# Patient Record
Sex: MALE | Race: WHITE
[De-identification: names, ages, dates, MRNs, and addresses within clinical notes are randomized per-mention and may not be internally consistent; named-entity substitution may affect disease eponyms.]

---

## 2017-02-21 ENCOUNTER — HOSPITAL ENCOUNTER (EMERGENCY)
Dept: HOSPITAL 57 - BURERS | Age: 52
Discharge: HOME | End: 2017-02-21
Payer: MEDICARE

## 2017-02-21 DIAGNOSIS — I10: ICD-10-CM

## 2017-02-21 DIAGNOSIS — I25.2: ICD-10-CM

## 2017-02-21 DIAGNOSIS — F17.210: ICD-10-CM

## 2017-02-21 DIAGNOSIS — S60.414A: ICD-10-CM
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
## 2017-02-21 DIAGNOSIS — S60.412A: ICD-10-CM

## 2017-02-21 DIAGNOSIS — S62.630B: Primary | ICD-10-CM

## 2017-02-21 LAB
ALP SERPL-CCNC: 80 U/L (ref 40–150)
ALT SERPL W P-5'-P-CCNC: 15 U/L (ref 0–55)
ANION GAP SERPL CALC-SCNC: 16 MMOL/L (ref 10–20)
AST SERPL-CCNC: 15 U/L (ref 5–34)
BASOPHILS # BLD AUTO: 0.1 THOU/UL (ref 0–0.2)
BASOPHILS NFR BLD AUTO: 1 % (ref 0–1)
BILIRUB SERPL-MCNC: 0.4 MG/DL (ref 0.2–1.2)
BUN SERPL-MCNC: 18 MG/DL (ref 8.4–25.7)
CALCIUM SERPL-MCNC: 8.9 MG/DL (ref 7.8–10.44)
CHLORIDE SERPL-SCNC: 106 MMOL/L (ref 98–107)
CO2 SERPL-SCNC: 24 MMOL/L (ref 22–29)
CREAT CL PREDICTED SERPL C-G-VRATE: 0 ML/MIN (ref 70–130)
EOSINOPHIL # BLD AUTO: 0.1 THOU/UL (ref 0–0.7)
EOSINOPHIL NFR BLD AUTO: 1.4 % (ref 0–10)
GLOBULIN SER CALC-MCNC: 3 G/DL (ref 2.4–3.5)
HCT VFR BLD CALC: 43.3 % (ref 42–52)
LYMPHOCYTES # BLD AUTO: 2.4 THOU/UL (ref 1.2–3.4)
MONOCYTES # BLD AUTO: 0.8 THOU/UL (ref 0.11–0.59)
MONOCYTES NFR BLD AUTO: 8.3 % (ref 0–10)
NEUTROPHILS # BLD AUTO: 6.1 THOU/UL (ref 1.4–6.5)
RBC # BLD AUTO: 4.78 MILL/UL (ref 4.7–6.1)
WBC # BLD AUTO: 9.5 THOU/UL (ref 4.8–10.8)

## 2017-02-21 PROCEDURE — 96365 THER/PROPH/DIAG IV INF INIT: CPT

## 2017-02-21 PROCEDURE — 85025 COMPLETE CBC W/AUTO DIFF WBC: CPT

## 2017-02-21 PROCEDURE — 96376 TX/PRO/DX INJ SAME DRUG ADON: CPT

## 2017-02-21 PROCEDURE — 96375 TX/PRO/DX INJ NEW DRUG ADDON: CPT

## 2017-02-21 PROCEDURE — 26770 TREAT FINGER DISLOCATION: CPT

## 2017-02-21 PROCEDURE — 26755 TREAT FINGER FRACTURE EACH: CPT

## 2017-02-21 PROCEDURE — 12002 RPR S/N/AX/GEN/TRNK2.6-7.5CM: CPT

## 2017-02-21 PROCEDURE — 80053 COMPREHEN METABOLIC PANEL: CPT

## 2017-02-21 NOTE — RAD
RIGHT HAND THREE VIEWS

2/21/17

 

Comparison is made with the prior study of 6/14/14 done at [facility]. There is an acute fr
acture of the distal phalanx of the index finger which is partially comminuted. The distal fragments
 are displaced towards the volar aspect of the hand quite significantly. There is an old amputation 
of the distal end of the third digit through a portion of the distal phalanx. This is not changed in
 appearance over time. An old fifth metacarpal fracture was seen. The carpal bones all appear intact
. 

 

IMPRESSION:  

1.      Acute displaced fracture of the distal phalanx of the index finger with partial comminution.
 

2.      Old traumatic injuries as detailed above.

 

POS: HOME

## 2018-01-29 ENCOUNTER — HOSPITAL ENCOUNTER (INPATIENT)
Dept: HOSPITAL 92 - ERS | Age: 53
LOS: 1 days | Discharge: HOME | DRG: 247 | End: 2018-01-30
Attending: INTERNAL MEDICINE | Admitting: INTERNAL MEDICINE
Payer: COMMERCIAL

## 2018-01-29 ENCOUNTER — HOSPITAL ENCOUNTER (EMERGENCY)
Dept: HOSPITAL 57 - BURERS | Age: 53
Discharge: ANOTHER HOSPITAL | End: 2018-01-29
Payer: COMMERCIAL

## 2018-01-29 VITALS — BODY MASS INDEX: 28.2 KG/M2

## 2018-01-29 DIAGNOSIS — I21.4: Primary | ICD-10-CM

## 2018-01-29 DIAGNOSIS — F17.210: ICD-10-CM

## 2018-01-29 DIAGNOSIS — E78.00: ICD-10-CM

## 2018-01-29 DIAGNOSIS — E78.5: ICD-10-CM

## 2018-01-29 DIAGNOSIS — I10: ICD-10-CM

## 2018-01-29 DIAGNOSIS — I20.0: Primary | ICD-10-CM

## 2018-01-29 DIAGNOSIS — I25.2: ICD-10-CM

## 2018-01-29 DIAGNOSIS — Z91.19: ICD-10-CM

## 2018-01-29 LAB
ALBUMIN SERPL BCG-MCNC: 4.3 G/DL (ref 3.5–5)
ALP SERPL-CCNC: 88 U/L (ref 40–150)
ALT SERPL W P-5'-P-CCNC: 16 U/L (ref 8–55)
ANION GAP SERPL CALC-SCNC: 18 MMOL/L (ref 10–20)
AST SERPL-CCNC: 14 U/L (ref 5–34)
BASOPHILS # BLD AUTO: 0.1 THOU/UL (ref 0–0.2)
BASOPHILS NFR BLD AUTO: 0.6 % (ref 0–1)
BILIRUB SERPL-MCNC: (no result) MG/DL (ref 0.2–1.2)
BUN SERPL-MCNC: 20 MG/DL (ref 8.4–25.7)
CALCIUM SERPL-MCNC: 9.3 MG/DL (ref 7.8–10.44)
CHLORIDE SERPL-SCNC: 103 MMOL/L (ref 98–107)
CK MB SERPL-MCNC: 3.4 NG/ML (ref 0–6.6)
CO2 SERPL-SCNC: 27 MMOL/L (ref 22–29)
CREAT CL PREDICTED SERPL C-G-VRATE: 0 ML/MIN (ref 70–130)
EOSINOPHIL # BLD AUTO: 0.2 THOU/UL (ref 0–0.7)
EOSINOPHIL NFR BLD AUTO: 1.9 % (ref 0–10)
GLOBULIN SER CALC-MCNC: 3.3 G/DL (ref 2.4–3.5)
GLUCOSE SERPL-MCNC: 140 MG/DL (ref 70–105)
HGB BLD-MCNC: 13.6 G/DL (ref 14–18)
LIPASE SERPL-CCNC: 24 U/L (ref 8–78)
LYMPHOCYTES # BLD AUTO: 2.7 THOU/UL (ref 1.2–3.4)
LYMPHOCYTES NFR BLD AUTO: 25.3 % (ref 21–51)
MCH RBC QN AUTO: 29.3 PG (ref 27–31)
MCV RBC AUTO: 87.2 FL (ref 80–94)
MDIFF COMPLETE?: YES
MONOCYTES # BLD AUTO: 0.7 THOU/UL (ref 0.11–0.59)
MONOCYTES NFR BLD AUTO: 6.6 % (ref 0–10)
NEUTROPHILS # BLD AUTO: 7.1 THOU/UL (ref 1.4–6.5)
NEUTROPHILS NFR BLD AUTO: 65.6 % (ref 42–75)
PLATELET # BLD AUTO: 398 THOU/UL (ref 130–400)
PLATELET BLD QL SMEAR: (no result)
POTASSIUM SERPL-SCNC: 3.8 MMOL/L (ref 3.5–5.1)
RBC # BLD AUTO: 4.64 MILL/UL (ref 4.7–6.1)
SODIUM SERPL-SCNC: 144 MMOL/L (ref 136–145)
TROPONIN I SERPL DL<=0.01 NG/ML-MCNC: (no result) NG/ML (ref ?–0.03)
TROPONIN I SERPL DL<=0.01 NG/ML-MCNC: 0.31 NG/ML (ref ?–0.03)
TROPONIN I SERPL DL<=0.01 NG/ML-MCNC: 0.6 NG/ML (ref ?–0.03)
TROPONIN I SERPL DL<=0.01 NG/ML-MCNC: 0.87 NG/ML (ref ?–0.03)
WBC # BLD AUTO: 10.7 THOU/UL (ref 4.8–10.8)

## 2018-01-29 PROCEDURE — 96374 THER/PROPH/DIAG INJ IV PUSH: CPT

## 2018-01-29 PROCEDURE — C9606 PERC D-E COR REVASC W AMI S: HCPCS

## 2018-01-29 PROCEDURE — 027034Z DILATION OF CORONARY ARTERY, ONE ARTERY WITH DRUG-ELUTING INTRALUMINAL DEVICE, PERCUTANEOUS APPROACH: ICD-10-PCS | Performed by: INTERNAL MEDICINE

## 2018-01-29 PROCEDURE — 36415 COLL VENOUS BLD VENIPUNCTURE: CPT

## 2018-01-29 PROCEDURE — 76942 ECHO GUIDE FOR BIOPSY: CPT

## 2018-01-29 PROCEDURE — 84484 ASSAY OF TROPONIN QUANT: CPT

## 2018-01-29 PROCEDURE — 93010 ELECTROCARDIOGRAM REPORT: CPT

## 2018-01-29 PROCEDURE — 96375 TX/PRO/DX INJ NEW DRUG ADDON: CPT

## 2018-01-29 PROCEDURE — 80306 DRUG TEST PRSMV INSTRMNT: CPT

## 2018-01-29 PROCEDURE — 85025 COMPLETE CBC W/AUTO DIFF WBC: CPT

## 2018-01-29 PROCEDURE — 96361 HYDRATE IV INFUSION ADD-ON: CPT

## 2018-01-29 PROCEDURE — 94760 N-INVAS EAR/PLS OXIMETRY 1: CPT

## 2018-01-29 PROCEDURE — 93798 PHYS/QHP OP CAR RHAB W/ECG: CPT

## 2018-01-29 PROCEDURE — 80053 COMPREHEN METABOLIC PANEL: CPT

## 2018-01-29 PROCEDURE — 83690 ASSAY OF LIPASE: CPT

## 2018-01-29 PROCEDURE — 82553 CREATINE MB FRACTION: CPT

## 2018-01-29 PROCEDURE — C1874 STENT, COATED/COV W/DEL SYS: HCPCS

## 2018-01-29 PROCEDURE — 92941 PRQ TRLML REVSC TOT OCCL AMI: CPT

## 2018-01-29 PROCEDURE — 93005 ELECTROCARDIOGRAM TRACING: CPT

## 2018-01-29 PROCEDURE — 80061 LIPID PANEL: CPT

## 2018-01-29 PROCEDURE — 93454 CORONARY ARTERY ANGIO S&I: CPT

## 2018-01-29 PROCEDURE — 85347 COAGULATION TIME ACTIVATED: CPT

## 2018-01-29 PROCEDURE — B2111ZZ FLUOROSCOPY OF MULTIPLE CORONARY ARTERIES USING LOW OSMOLAR CONTRAST: ICD-10-PCS | Performed by: INTERNAL MEDICINE

## 2018-01-29 PROCEDURE — 81001 URINALYSIS AUTO W/SCOPE: CPT

## 2018-01-29 PROCEDURE — 02C03ZZ EXTIRPATION OF MATTER FROM CORONARY ARTERY, ONE ARTERY, PERCUTANEOUS APPROACH: ICD-10-PCS | Performed by: INTERNAL MEDICINE

## 2018-01-29 PROCEDURE — 71045 X-RAY EXAM CHEST 1 VIEW: CPT

## 2018-01-29 PROCEDURE — C1769 GUIDE WIRE: HCPCS

## 2018-01-29 PROCEDURE — 96372 THER/PROPH/DIAG INJ SC/IM: CPT

## 2018-01-29 PROCEDURE — 93306 TTE W/DOPPLER COMPLETE: CPT

## 2018-01-29 RX ADMIN — HYDROCODONE BITARTRATE AND ACETAMINOPHEN PRN TAB: 10; 325 TABLET ORAL at 21:16

## 2018-01-29 NOTE — HP
PRIMARY CARE PHYSICIAN:  Leonard Rivas M.D.

 

REASON FOR ADMISSION:  Transfer from [facility] for chest pain and 
non-STEMI.

 

HISTORY OF PRESENT ILLNESS:  A 52-year-old male with a history of tobacco abuse 
disorder, as well as history of myocardial infarction and coronary artery 
disease with stent, who is not taking any specific medication.  He has 
underlying noncompliance history.  He has ongoing smoking.  He went to [facility] with acute onset of chest pain.  Patient reports that chest pain 
started when he was resting at home which was substernal as well as left-sided 
in location, pressure-like sensation associated with nausea and shortness of 
breath.  Patient describes pain as dull in nature about 10/10 in intensity.  It 
was very severe when it started and after emergency room treatment, patient's 
pain reduced but not completely subsided.  The patient reports that all of this 
has been started while doing sex.  He took Viagra yesterday evening.  He denies 
any associated dizziness, palpitations or syncope.  He denies any pleuritic 
chest pain.  He denies any fever or chills.  He denies any hematochezia, melena
, constipation, diarrhea, UTI symptoms.  He denies any lower extremity edema or 
any immobilization.

 

At [facility], patient's electrocardiogram was unremarkable and 
his first troponin was negative.  When he was transferred to our emergency room
, we did second cardiac enzyme and that was significantly abnormal.  Patient 
was still having pain.

 

EMERGENCY ROOM COURSE:  Patient was given aspirin 324 mg, Lopressor 5 mg x2 and 
Lovenox 1 mg per kg at [facility].  Patient was also given 
morphine 2 mg IV push for his pain and he was given IV fluid.

 

PAST MEDICAL HISTORY:  Hypertension, but the patient is not taking any 
medication, history of MI with coronary artery disease with stent placement, 
medical noncompliance.

 

PAST SURGICAL HISTORY:  Patient has amputation of tip of the third digit of 
right upper extremity, tonsillectomy, bilateral hip replacement, cardiac 
catheterization with stent placement.

 

PAST PSYCHIATRIC HISTORY:  Medical noncompliance, no specific psychiatric 
history.

 

SOCIAL HISTORY:  Patient is smoking about 1.5 packs per day.  He denies any 
alcohol abuse.  He denies any other illicit drug abuse.  He is a .

 

FAMILY HISTORY:  No strong family history of premature coronary artery disease, 
stroke or cancer.

 

ALLERGIES:  Patient denies any allergies to medications.

 

CURRENT HOME MEDICATION:  Patient is taking Norco 10 one tablet as needed basis.



REVIEW OF SYSTEMS:  The following complete review of systems was negative, 
unless otherwise mentioned in the HPI or below:

Constitutional:  Weight loss or gain, ability to conduct usual activities.

Skin:  Rash, itching.

Eyes:  Double vision, pain.

ENT/Mouth:  Nose bleeding, neck stiffness, pain, tenderness.

Cardiovascular:  Palpitations, dyspnea on exertion, orthopnea.

Respiratory:  Shortness of breath, wheezing, cough, hemoptysis, fever or night 
sweats.

Gastrointestinal:  Poor appetite, abdominal pain, heartburn, nausea, vomiting, 
constipation, or diarrhea.

Genitourinary:  Urgency, frequency, dysuria, nocturia.

Musculoskeletal:  Pain, swelling.

Neurologic/Psychiatric:  Anxiety, depression.

Allergy/Immunologic:  Skin rash, bleeding tendency.

 

Please see my HPI for pertinent positive and negative.  All other review of 
systems reviewed and negative except as mentioned in the HPI.

 

PHYSICAL EXAMINATION:

VITAL SIGNS:  On arrival to [facility], blood pressure 192/101, 
pulse 95, respiratory rate 18, saturation 97% on room air, temperature 98.2, 
weight 86.1 kilograms, currently blood pressure improved to 118/57.

GENERAL:  Patient is currently alert, awake, no obvious acute distress.

HEAD:  Normocephalic, atraumatic.

EYES:  Pupils round, reactive to light.  Extraocular muscle intact.

ENT:  Oropharynx within normal limits.  Moist mucous membranes.  No oral 
lesions.  No pharyngeal erythema, no exudates.

NECK:  Supple, no JVD, no thyromegaly, no carotid bruit.

LUNGS:  Clear to auscultation without any rhonchi or rales.

CARDIAC:  S1, S2 regular without any murmur, no gallop, no rub.

ABDOMEN:  Soft, bowel sounds present.  No suprapubic tenderness.  No 
organomegaly, no mass, no peritoneal sign.

BACK:  Examination unremarkable, no CVA tenderness.

EXTREMITIES:  Upper extremity passive movements of all joints are normal.  
Lower extremities:  No edema.  Good peripheral pulsation.

SKIN:  No skin rash.

HEMATOLOGICAL SYSTEM:  No lymphadenopathy.

PSYCHIATRIC:  Normal affect.

NEUROLOGIC:  The patient is alert and oriented x3.  Cranial nerves II-XII 
intact.  Motor and sensation within normal limits.  Reflexes symmetrical.  No 
focal neurological deficit noted.

 

IMAGING DATA AND SIGNIFICANT LABORATORY DATA:

1.  EKG based on my review reveals normal sinus rhythm, nonspecific ST-T 
changes.

2.  Second EKG after troponin elevation also showed normal sinus rhythm without 
any change.

3.  CBC:  WBC 10.7, hemoglobin 13.6, and platelets 398 with 4 band.

4.  BMP:  Sodium 144, potassium 3.8, chloride 103, carbon dioxide 27, anion gap 
18, BUN 20, creatinine 1.24, glucose 140, calcium 9.3.

5.  LFT:  AST 14, ALT 16, alkaline phosphatase 88, albumin 4.3, lipase 24.  CK-
MB 3.4, troponin I less than 0.010, subsequently troponin 0.313.

6.  Chest x-ray based on my review, no acute cardiopulmonary process.

 

ASSESSMENT AND PLAN/IMPRESSION:

1.  Non-ST elevation myocardial infarction.  This patient has acute onset of 
angina that started with sex.  Patient took Viagra and that is why we will 
avoid nitroglycerin to prevent hypotension.  The patient's first troponin was 
normal and second troponin is significantly abnormal.  His EKG is normal.  
Currently, still patient has reduced intensity pain.  At this point, patient 
already received Lopressor 5 mg x2 at [facility], aspirin 325 mg 
and Lovenox 1 mg per kg at [facility].  We will keep this patient 
in the hospital for full admission.  We will consult cardiology.  The patient 
may need a cardiac catheterization to rule out in-stent stenosis versus new 
stenoses.  The patient has several risk factors of coronary artery disease 
including smoking, noncompliance with the treatment.  We will check lipid 
profile for risk stratification and we will also start Lipitor 40 mg p.o. at 
bedtime.  We will continue with Coreg 3.125 mg p.o. b.i.d.  Healthy lifestyle 
measures discussed with the patient.  We will obtain echocardiography to assess 
ejection fraction and other structural abnormality.  We will check urine drug 
screen.  Further decisions will defer to Cardiology.

2.  History of coronary artery disease with history of stent.  The patient is 
not taking any medication at this point.  He is very noncompliant with the 
treatment.

3.  Please see for further details in problem #1.

4.  Tobacco abuse disorder.  Smoking cessation counseling given.  Healthy 
lifestyle measures discussed with the patient.  We will offer nicotine patch if 
needed.

5.  Hypertension.  We are starting Coreg 3.125 mg p.o. b.i.d.  We will also 
start ACE inhibitor if needed based on echocardiographic finding.

6.  Medical noncompliance.  I provided patient counseling to be compliant with 
medical treatment.

7.  Deep venous thrombosis prophylaxis.  Patient is already on full dose of 
Lovenox therapy.

8.  Gastrointestinal prophylaxis.  Pepcid 20 mg p.o. b.i.d.

9.  Code status:  The patient is FULL CODE.  The patient is making his decision 
by himself.  He does not have any surrogate decision maker.

 

Disposition and plan based on clinical course and cardiology recommendation, 
this patient expected to stay in hospital more than 2 midnights.  Plan of care 
discussed with the patient and family member at bedside in the emergency room.

 

MTDD

## 2018-01-29 NOTE — PDOC.EVN
Event Note





- Event Note


Event Note: 





Pt seen and examined in ER hold. chart reviewed. still having Chest 

pain.tropoinin trending up.Discussed w cardiology.Will give 1 dose plavix 300 

mg and possible Cath lab later today.will follow..HD stable

## 2018-01-29 NOTE — RAD
PORTABLE CHEST:

 

DATE: 1/29/18.

 

FINDINGS: 

An AP portable film at 0128 is compared with the 5/18/17 study.

 

The heart is normal in size and the lungs are clear.  No infiltrate or effusion was seen.  There is n
o vascular congestion or edema.  The trachea is midline. 

 

IMPRESSION: 

No acute thoracic finding.

 

POS: HOME

## 2018-01-29 NOTE — CON
DATE OF CONSULTATION:  2018

 

REASON FOR CONSULTATION:  Non-ST elevation myocardial infarction with ongoing chest pain.

 

HISTORY OF PRESENT ILLNESS:  Mr. Taylor is a 52-year-old man.  The patient is a previous patient 
of Dr. Arsen Huertas with a previous history of myocardial infarction.

 

The patient initially presented with an acute inferior myocardial infarction in 2015.  He was take
n on an emergency basis to the cardiac catheterization lab and was found to have acute inferior wall 
myocardial infarction.  He did need a transient pacing for severe bradycardia.

 

The patient was found to have the followin.  Left main widely patent.

2.  LAD, 30%-40% eccentric plaque in the mid LAD.

3.  Ramus, no significant stenosis.

4.  Left circumflex, 20% plaque.

5.  Right coronary was 100% occluded distal to the posterior descending branch.  A stent was placed, 
3.0 x 28 mm Promus drug-eluting stent at 12 atmospheres.  There was noted to be a codominant artery. 
 Reading the notices, the right coronary was totally occluded distally prior to the PDA.  From readin
g the note, it looks like the stent was placed across the PDA.

 

The patient's bradycardia improved subsequently.

 

The patient has done relatively well following that, but he said last night, he had the onset of earnestine
re chest pain.  Ultimately, he came to the emergency room.

 

EKG showed an old inferior infarct with Q-waves in lead 3 and aVF, but no ST changes.  The patient wa
s having initially severe chest pain, continues to have chest pain now, although, it is less severe.

 

Medications, the patient stopped taking all of his medications including aspirin.  He was given aspir
in and Lovenox in the [facility].  He has been given Plavix 300 mg here.

 

PAST SURGICAL HISTORY:  Amputation at the tip of the third digit, right upper extremity.

 

SOCIAL HISTORY:  Smokes one pack of cigarettes per day.  No alcohol.

 

FAMILY HISTORY:  No strong family history of premature coronary artery disease.

 

ALLERGIES:  None known.

 

MEDICATIONS:  He stopped all medicines.  The only medicine he was taking is Norco as needed basis.

 

PHYSICAL EXAMINATION:

GENERAL:  This is a 52-year-old man who says he did not get much sleep last night.

VITAL SIGNS:  Blood pressure 130/70, pulse 60, it is regular.

HEENT:  Eyes:  Sclerae nonicteric.  Mouth, mucous membranes moist.

NECK:  Supple, no lymphadenopathy.

LUNGS:  Clear, no wheezing, rales or rhonchi.

CARDIAC:  Normal S1, normal S2.  There is no murmur, rub or gallop.

ABDOMEN:  Soft, nontender, no hepatosplenomegaly.

EXTREMITIES:  Warm, dry, no clubbing, cyanosis or edema.

HEMATOLOGIC:  No unusual bruising.

GENITOURINARY:  No burning with urination.

MUSCULOSKELETAL:  No unusual joint abnormalities.  Peripheral pulses are intact in the right leg and 
pedal and posterior tibial pulses.

 

LABORATORY DATA AND X-RAY FINDINGS:  EKG, sinus rhythm with biphasic T waves in V2 and V3 as well as 
V4.

 

Troponin level has gone from 0.313 to 0.874.  Creatinine was 1.24.  BNP 11.  Hemoglobin is 13.6, hema
tocrit 40.4.

 

ASSESSMENT:

1.  Non-ST elevation myocardial infarction with continued angina and slight increased levels of tropo
jasmine.

2.  Noncompliance, has had stopped all medicines.

3.  Noncompliance with smoking cessation.  Continue to smoke.

4.  History of bradycardia when he had the inferior myocardial infarction.

5.  Ongoing chest pain.

6.  History of Viagra dose over 12 hours ago.  The exact time is not completely certain, he thinks it
 is about 11:00.

 

PLAN:  At this time, since he has continued chest pain, we will proceed to cardiac catheterization la
b.  Discussed there is a risk of severe hypotension if nitroglycerin as needed, nitroglycerin may be 
needed in case an intervention is indicated.  I will give intravenous fluids first and proceed.  Disc
ussed risks of stroke, heart attack, iodine allergy, loss of blood supply to the leg or kidney, risk 
of severe reaction to nitrates in case if  needed.  All discussed with the patient and wife.  They al
so understand that it is essential that he take medications as prescribed.  He has not been taking ev
en aspirin.  I have to discuss with the family for now and he needs to take an aspirin every day for 
the rest of his life.

## 2018-01-30 VITALS — DIASTOLIC BLOOD PRESSURE: 84 MMHG | SYSTOLIC BLOOD PRESSURE: 131 MMHG

## 2018-01-30 VITALS — TEMPERATURE: 98.3 F

## 2018-01-30 LAB
ALBUMIN SERPL BCG-MCNC: 3.7 G/DL (ref 3.5–5)
ALP SERPL-CCNC: 69 U/L (ref 40–150)
ALT SERPL W P-5'-P-CCNC: 13 U/L (ref 8–55)
ANION GAP SERPL CALC-SCNC: 11 MMOL/L (ref 10–20)
AST SERPL-CCNC: 15 U/L (ref 5–34)
BASOPHILS # BLD AUTO: 0 THOU/UL (ref 0–0.2)
BASOPHILS NFR BLD AUTO: 0.2 % (ref 0–1)
BILIRUB SERPL-MCNC: 0.3 MG/DL (ref 0.2–1.2)
BUN SERPL-MCNC: 12 MG/DL (ref 8.4–25.7)
CALCIUM SERPL-MCNC: 8.5 MG/DL (ref 7.8–10.44)
CHD RISK SERPL-RTO: 4.4 (ref ?–4.5)
CHLORIDE SERPL-SCNC: 107 MMOL/L (ref 98–107)
CHOLEST SERPL-MCNC: 148 MG/DL
CO2 SERPL-SCNC: 25 MMOL/L (ref 22–29)
CREAT CL PREDICTED SERPL C-G-VRATE: 121 ML/MIN (ref 70–130)
CRYSTAL-AUWI FLAG: 0 (ref 0–15)
DRUG SCREEN CUTOFF: (no result)
EOSINOPHIL # BLD AUTO: 0.2 THOU/UL (ref 0–0.7)
EOSINOPHIL NFR BLD AUTO: 1.9 % (ref 0–10)
GLOBULIN SER CALC-MCNC: 2.5 G/DL (ref 2.4–3.5)
GLUCOSE SERPL-MCNC: 116 MG/DL (ref 70–105)
HDLC SERPL-MCNC: 34 MG/DL
HEV IGM SER QL: 0.2 (ref 0–7.99)
HGB BLD-MCNC: 12.1 G/DL (ref 14–18)
HYALINE CASTS #/AREA URNS LPF: (no result) LPF
LDLC SERPL CALC-MCNC: 92 MG/DL
LYMPHOCYTES # BLD: 0.8 THOU/UL (ref 1.2–3.4)
LYMPHOCYTES NFR BLD AUTO: 9.6 % (ref 21–51)
MCH RBC QN AUTO: 29.6 PG (ref 27–31)
MCV RBC AUTO: 91 FL (ref 80–94)
MEDTOX CONTROL LINE VALID?: (no result)
MEDTOX READER #: (no result)
MONOCYTES # BLD AUTO: 0.7 THOU/UL (ref 0.11–0.59)
MONOCYTES NFR BLD AUTO: 8.2 % (ref 0–10)
NEUTROPHILS # BLD AUTO: 6.7 THOU/UL (ref 1.4–6.5)
NEUTROPHILS NFR BLD AUTO: 80.1 % (ref 42–75)
PATHC CAST-AUWI FLAG: 0 (ref 0–2.49)
PLATELET # BLD AUTO: 331 THOU/UL (ref 130–400)
POTASSIUM SERPL-SCNC: 4 MMOL/L (ref 3.5–5.1)
RBC # BLD AUTO: 4.1 MILL/UL (ref 4.7–6.1)
RBC UR QL AUTO: (no result) HPF (ref 0–3)
SODIUM SERPL-SCNC: 139 MMOL/L (ref 136–145)
SP GR UR STRIP: 1.02 (ref 1–1.04)
SPERM-AUWI FLAG: 0 (ref 0–9.9)
TRIGL SERPL-MCNC: 109 MG/DL (ref ?–150)
TROPONIN I SERPL DL<=0.01 NG/ML-MCNC: 1.74 NG/ML (ref ?–0.03)
WBC # BLD AUTO: 8.4 THOU/UL (ref 4.8–10.8)
YEAST-AUWI FLAG: 0 (ref 0–25)

## 2018-01-30 RX ADMIN — HYDROCODONE BITARTRATE AND ACETAMINOPHEN PRN TAB: 10; 325 TABLET ORAL at 03:07

## 2018-01-30 RX ADMIN — HYDROCODONE BITARTRATE AND ACETAMINOPHEN PRN TAB: 10; 325 TABLET ORAL at 08:12

## 2018-01-30 NOTE — PRG
DATE OF SERVICE:  01/30/2018

 

SUBJECTIVE:  The patient did well.  No chest pain or pressure.  He is outside now, not in his room, n
ot on the floor.

 

OBJECTIVE:

VITAL SIGNS:  Blood pressure 130/80, pulse when I examined him was 84.

LUNGS:  Clear.

CARDIAC:  Normal S1, normal S2.

ABDOMEN:  Soft, nontender.

EXTREMITIES:  No edema.

 

LABORATORY DATA:  Troponin was 1.7.

 

ASSESSMENT:

1.  Status post non-ST elevation infarction.

2.  History of smoking.

3.  History of noncompliance, off all medicines.

4.  Hypercholesterolemia.

 

PLAN:  He is to go home on,

1.  Brilinta 90 mg twice a day.

2.  Aspirin 81 mg  a day.

3.  Lisinopril 2.5 mg a day.

4.  Carvedilol 6.25 mg twice a day. 

5.  Lipitor 40 mg a day.

6.  Gave him some samples and a discount card for Brilinta.

## 2018-01-31 NOTE — DIS
PRIMARY CARE PHYSICIAN:  Leonard Rivas M.D.

 

DATE OF ADMISSION:  01/29/2018

 

DATE OF DISCHARGE:  01/30/2018

 

CONDITION AT THE TIME OF DISCHARGE:  Stable and improved.

 

DISCHARGE DIAGNOSES:

1.  Non-ST elevation myocardial infarction.

2.  Tobacco abuse.

3.  Hypertension.

4.  Dyslipidemia.

 

DISCHARGE MEDICATIONS:  Brilinta 90 mg b.i.d., aspirin 81 mg daily, lisinopril 2.5 mg daily, Coreg 6.
25 mg b.i.d., Lipitor 40 mg daily.

 

CONSULTATIONS:  Cardiology, Dr. Candy Echeverria.

 

PROCEDURES DONE:  Include cardiac catheterization with drug-eluting stent in the LAD.

 

Transthoracic echocardiogram which showed preserved ejection fraction of 50-55%, but inferior wall hy
pokinesis.

 

HISTORY OF PRESENTING ILLNESS:  Mr. Taylor is a 52-year-old male with known history of coronary a
rtery disease, status post stenting in the past, was very noncompliant with his modifications and con
tinues to smoke who presented to the emergency room with complaints of chest pain and severe in inten
sity.  He was found to have elevated cardiac enzymes and received aspirin, Lopressor, and Lovenox in 
the Emergency Room along with pain medication and was admitted for further evaluation.  Cardiology wa
s called from the emergency room and his troponin continued to trend upwards.  He received a dose of 
Plavix and was taken emergently to cardiac catheterization.  He received a drug-eluting stent in the 
left anterior descending artery.  He also has 40%, 50% and 60% stenoses elsewhere.  After the cardiac
 catheterization, he remained hemodynamically stable.  Dr. Echeverria saw the patient and he was started 
back on aspirin, statin, beta blocker, ACE inhibitor as well as antiplatelet medication in the form B
rilinta.

 

Prior to discharge, he was asymptomatic.  I discussed discharge plan with him and his significant oth
er and they said they will be able to afford the medications.  Samples for Brilinta were made availab
le to them by Dr. Echeverria as well.  He was instructed to follow up with Dr. Echeverria as well as his [facility] physician as soon as possible.

 

PHYSICAL EXAMINATION:

He was seen and examined prior to discharge.  His physical exam included:

VITAL SIGNS:  Temperature 98.3, pulse of 81, respirations 18, saturating 95% on room air, blood press
ure 131/84.

GENERAL:  No acute distress, awake, alert, oriented x3.

CHEST:  Clear to auscultation bilaterally.

CARDIOVASCULAR:  Rate and rhythm is regular.

NEUROLOGIC:  Nonfocal.

 

LABORATORY DATA:  CBC unremarkable.  Serum chemistries showed troponin peaked at 1.742.  Lipid panel 
within normal limits.  Urine drug screen was negative except for opiates.

 

He will follow up with cardiac rehabilitation as an outpatient.

## 2018-02-03 NOTE — EKG
Test Reason : 

Blood Pressure : ***/*** mmHG

Vent. Rate : 070 BPM     Atrial Rate : 070 BPM

   P-R Int : 146 ms          QRS Dur : 098 ms

    QT Int : 432 ms       P-R-T Axes : -14 067 083 degrees

   QTc Int : 466 ms

 

Normal sinus rhythm

Possible Inferior infarct , age undetermined

Abnormal ECG

 

Confirmed by REUBEN ZUNIGA ([id number]),  PARMINDER VARGHESE ([id number]) on 2/3/2018 1:46:53 PM

 

Referred By:  EFRAIN           Confirmed By:REUBEN ZUNIGA

## 2018-02-03 NOTE — EKG
Test Reason : 

Blood Pressure : ***/*** mmHG

Vent. Rate : 069 BPM     Atrial Rate : 069 BPM

   P-R Int : 140 ms          QRS Dur : 100 ms

    QT Int : 426 ms       P-R-T Axes : -13 079 071 degrees

   QTc Int : 456 ms

 

Normal sinus rhythm

Normal ECG

 

Confirmed by REUBEN ZUNIGA ([id number]),  PARMINDER VARGHESE ([id number]) on 2/3/2018 1:47:01 PM

 

Referred By:             Confirmed By:REUBEN ZUNIGA

## 2019-06-14 ENCOUNTER — HOSPITAL ENCOUNTER (EMERGENCY)
Dept: HOSPITAL 92 - ERS | Age: 54
LOS: 1 days | Discharge: HOME | End: 2019-06-15
Payer: COMMERCIAL

## 2019-06-14 DIAGNOSIS — N28.89: ICD-10-CM

## 2019-06-14 DIAGNOSIS — Z87.891: ICD-10-CM

## 2019-06-14 DIAGNOSIS — S00.01XA: ICD-10-CM

## 2019-06-14 DIAGNOSIS — I10: ICD-10-CM
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
## 2019-06-14 DIAGNOSIS — Z79.899: ICD-10-CM

## 2019-06-14 DIAGNOSIS — S06.0X9A: Primary | ICD-10-CM

## 2019-06-14 DIAGNOSIS — Z79.891: ICD-10-CM

## 2019-06-14 DIAGNOSIS — I25.2: ICD-10-CM

## 2019-06-14 PROCEDURE — 74177 CT ABD & PELVIS W/CONTRAST: CPT

## 2019-06-14 PROCEDURE — 96375 TX/PRO/DX INJ NEW DRUG ADDON: CPT

## 2019-06-14 PROCEDURE — 70450 CT HEAD/BRAIN W/O DYE: CPT

## 2019-06-14 PROCEDURE — 93005 ELECTROCARDIOGRAM TRACING: CPT

## 2019-06-14 PROCEDURE — 96374 THER/PROPH/DIAG INJ IV PUSH: CPT

## 2019-06-14 PROCEDURE — 71260 CT THORAX DX C+: CPT

## 2019-06-14 PROCEDURE — 72125 CT NECK SPINE W/O DYE: CPT
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
## 2019-06-27 ENCOUNTER — HOSPITAL ENCOUNTER (EMERGENCY)
Dept: HOSPITAL 92 - ERS | Age: 54
Discharge: HOME | End: 2019-06-27
Payer: COMMERCIAL

## 2019-06-27 DIAGNOSIS — Z79.82: ICD-10-CM

## 2019-06-27 DIAGNOSIS — I10: ICD-10-CM

## 2019-06-27 DIAGNOSIS — I25.10: ICD-10-CM

## 2019-06-27 DIAGNOSIS — Z79.899: ICD-10-CM
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
## 2019-06-27 DIAGNOSIS — I25.2: ICD-10-CM

## 2019-06-27 DIAGNOSIS — S20.211A: Primary | ICD-10-CM

## 2019-06-27 DIAGNOSIS — Z87.891: ICD-10-CM

## 2019-06-27 LAB
ALBUMIN SERPL BCG-MCNC: 4.4 G/DL (ref 3.5–5)
ALP SERPL-CCNC: 102 U/L (ref 40–150)
ALT SERPL W P-5'-P-CCNC: 15 U/L (ref 8–55)
ANION GAP SERPL CALC-SCNC: 15 MMOL/L (ref 10–20)
AST SERPL-CCNC: 12 U/L (ref 5–34)
BASOPHILS # BLD AUTO: 0.1 THOU/UL (ref 0–0.2)
BASOPHILS NFR BLD AUTO: 0.7 % (ref 0–1)
BILIRUB SERPL-MCNC: 0.3 MG/DL (ref 0.2–1.2)
BUN SERPL-MCNC: 10 MG/DL (ref 8.4–25.7)
CALCIUM SERPL-MCNC: 8.8 MG/DL (ref 7.8–10.44)
CHLORIDE SERPL-SCNC: 103 MMOL/L (ref 98–107)
CO2 SERPL-SCNC: 27 MMOL/L (ref 22–29)
CREAT CL PREDICTED SERPL C-G-VRATE: 0 ML/MIN (ref 70–130)
EOSINOPHIL # BLD AUTO: 0.3 THOU/UL (ref 0–0.7)
EOSINOPHIL NFR BLD AUTO: 3 % (ref 0–10)
GLOBULIN SER CALC-MCNC: 2.3 G/DL (ref 2.4–3.5)
GLUCOSE SERPL-MCNC: 125 MG/DL (ref 70–105)
HGB BLD-MCNC: 12.2 G/DL (ref 14–18)
LIPASE SERPL-CCNC: 17 U/L (ref 8–78)
LYMPHOCYTES # BLD: 2 THOU/UL (ref 1.2–3.4)
LYMPHOCYTES NFR BLD AUTO: 22.3 % (ref 21–51)
MCH RBC QN AUTO: 29.5 PG (ref 27–31)
MCV RBC AUTO: 91.3 FL (ref 78–98)
MONOCYTES # BLD AUTO: 0.7 THOU/UL (ref 0.11–0.59)
MONOCYTES NFR BLD AUTO: 7.6 % (ref 0–10)
NEUTROPHILS # BLD AUTO: 6 THOU/UL (ref 1.4–6.5)
NEUTROPHILS NFR BLD AUTO: 66.4 % (ref 42–75)
PLATELET # BLD AUTO: 322 THOU/UL (ref 130–400)
POTASSIUM SERPL-SCNC: 4.1 MMOL/L (ref 3.5–5.1)
RBC # BLD AUTO: 4.12 MILL/UL (ref 4.7–6.1)
SODIUM SERPL-SCNC: 141 MMOL/L (ref 136–145)
WBC # BLD AUTO: 9.1 THOU/UL (ref 4.8–10.8)

## 2019-06-27 PROCEDURE — 93005 ELECTROCARDIOGRAM TRACING: CPT

## 2019-06-27 PROCEDURE — 83690 ASSAY OF LIPASE: CPT

## 2019-06-27 PROCEDURE — 96372 THER/PROPH/DIAG INJ SC/IM: CPT

## 2019-06-27 PROCEDURE — 85025 COMPLETE CBC W/AUTO DIFF WBC: CPT

## 2019-06-27 PROCEDURE — 84484 ASSAY OF TROPONIN QUANT: CPT

## 2019-06-27 PROCEDURE — 71045 X-RAY EXAM CHEST 1 VIEW: CPT

## 2019-06-27 PROCEDURE — 80053 COMPREHEN METABOLIC PANEL: CPT

## 2019-06-27 PROCEDURE — 36415 COLL VENOUS BLD VENIPUNCTURE: CPT
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
## 2020-03-05 ENCOUNTER — HOSPITAL ENCOUNTER (EMERGENCY)
Dept: HOSPITAL 92 - ERS | Age: 55
End: 2020-03-05
Payer: COMMERCIAL

## 2020-03-05 DIAGNOSIS — I46.9: Primary | ICD-10-CM

## 2020-03-05 LAB
ALBUMIN SERPL BCG-MCNC: 4.1 G/DL (ref 3.5–5)
ALP SERPL-CCNC: 66 U/L (ref 40–110)
ALT SERPL W P-5'-P-CCNC: 85 U/L (ref 8–55)
ANION GAP SERPL CALC-SCNC: 21 MMOL/L (ref 10–20)
AST SERPL-CCNC: 74 U/L (ref 5–34)
BICARBONATE (HCO3V): 29.1 MMOL/L (ref 22–28)
BILIRUB SERPL-MCNC: 0.4 MG/DL (ref 0.2–1.2)
BUN SERPL-MCNC: 20 MG/DL (ref 8.4–25.7)
CA-I BLD-SCNC: 1.11 MMOL/L
CALCIUM SERPL-MCNC: 8.2 MG/DL (ref 7.8–10.44)
CHLORIDE SERPL-SCNC: 110 MMOL/L (ref 98–107)
CHLORIDE SERPL-SCNC: 110 MMOL/L (ref 98–107)
CK SERPL-CCNC: 220 U/L (ref 30–200)
CO2 BLDV CALC-SCNC: 31.7 MMOL/L (ref 22–28)
CO2 SERPL-SCNC: 25 MMOL/L (ref 22–29)
CO2 TENSION (PVCO2): 83.4 MMHG (ref 40–50)
CREAT CL PREDICTED SERPL C-G-VRATE: 0 ML/MIN (ref 70–130)
DRUG SCREEN CUTOFF: (no result)
GLOBULIN SER CALC-MCNC: 2.1 G/DL (ref 2.4–3.5)
GLUCOSE SERPL-MCNC: 224 MG/DL (ref 70–105)
GLUCOSE SERPL-MCNC: 231 MG/DL (ref 70–105)
HCT VFR BLD CALC: 42 % (ref 42–52)
HEMOGLOBIN - CALC: 14.2 G/DL (ref 14–18)
HGB BLD-MCNC: 13.7 G/DL (ref 14–18)
MCH RBC QN AUTO: 29.8 PG (ref 27–31)
MCV RBC AUTO: 91.5 FL (ref 78–98)
MDIFF COMPLETE?: YES
MEDTOX CONTROL LINE VALID?: (no result)
MEDTOX READER #: (no result)
PLATELET # BLD AUTO: 127 THOU/UL (ref 130–400)
POTASSIUM SERPL-SCNC: 5 MMOL/L (ref 3.5–5.1)
POTASSIUM SERPL-SCNC: 5.3 MMOL/L (ref 3.5–5.1)
PROT UR STRIP.AUTO-MCNC: 200 MG/DL
RBC # BLD AUTO: 4.61 MILL/UL (ref 4.7–6.1)
RBC UR QL AUTO: (no result) HPF (ref 0–3)
SAO2 % BLDV FROM PO2: 46.8 % (ref 60–85)
SODIUM SERPL-SCNC: 149 MMOL/L (ref 138–145)
SODIUM SERPL-SCNC: 151 MMOL/L (ref 136–145)
SPERM UR QL AUTO: (no result) HPF
WBC # BLD AUTO: 9.4 THOU/UL (ref 4.8–10.8)
WBC UR QL AUTO: (no result) HPF (ref 0–3)

## 2020-03-05 PROCEDURE — 31500 INSERT EMERGENCY AIRWAY: CPT

## 2020-03-05 PROCEDURE — 96375 TX/PRO/DX INJ NEW DRUG ADDON: CPT

## 2020-03-05 PROCEDURE — 82330 ASSAY OF CALCIUM: CPT

## 2020-03-05 PROCEDURE — 84484 ASSAY OF TROPONIN QUANT: CPT

## 2020-03-05 PROCEDURE — 85025 COMPLETE CBC W/AUTO DIFF WBC: CPT

## 2020-03-05 PROCEDURE — 51702 INSERT TEMP BLADDER CATH: CPT

## 2020-03-05 PROCEDURE — 83605 ASSAY OF LACTIC ACID: CPT

## 2020-03-05 PROCEDURE — 80306 DRUG TEST PRSMV INSTRMNT: CPT

## 2020-03-05 PROCEDURE — 81003 URINALYSIS AUTO W/O SCOPE: CPT

## 2020-03-05 PROCEDURE — 81015 MICROSCOPIC EXAM OF URINE: CPT

## 2020-03-05 PROCEDURE — 96374 THER/PROPH/DIAG INJ IV PUSH: CPT

## 2020-03-05 PROCEDURE — 96361 HYDRATE IV INFUSION ADD-ON: CPT

## 2020-03-05 PROCEDURE — 80053 COMPREHEN METABOLIC PANEL: CPT

## 2020-03-05 PROCEDURE — 82803 BLOOD GASES ANY COMBINATION: CPT

## 2020-03-05 PROCEDURE — 92950 HEART/LUNG RESUSCITATION CPR: CPT

## 2020-03-05 PROCEDURE — 82550 ASSAY OF CK (CPK): CPT
